# Patient Record
Sex: FEMALE | Race: WHITE | Employment: FULL TIME | URBAN - METROPOLITAN AREA
[De-identification: names, ages, dates, MRNs, and addresses within clinical notes are randomized per-mention and may not be internally consistent; named-entity substitution may affect disease eponyms.]

---

## 2019-07-23 ENCOUNTER — HOSPITAL ENCOUNTER (OUTPATIENT)
Dept: PHYSICAL THERAPY | Age: 29
Discharge: HOME | End: 2019-07-23
Payer: COMMERCIAL

## 2019-07-23 DIAGNOSIS — M54.2 NECK PAIN: ICD-10-CM

## 2019-07-23 DIAGNOSIS — M54.9 UPPER BACK PAIN: ICD-10-CM

## 2019-07-23 PROCEDURE — 97161 PT EVAL LOW COMPLEX 20 MIN: CPT

## 2019-07-23 PROCEDURE — 97110 THERAPEUTIC EXERCISES: CPT

## 2019-07-23 PROCEDURE — 97014 ELECTRIC STIMULATION THERAPY: CPT
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
## 2019-07-23 NOTE — PROGRESS NOTES
Gerardo Hernandez  : 1990  Payor: Romulo Downey / Plan:  [address],[facility] 200 / Product Type: PPO /  [address] [place]  at [facility] Therapy  [address], [facility], [address]  [phone]   [phone]      OUTPATIENT PHYSICAL THERAPY: Daily Treatment Note 2019  Visit Count:  1    ICD-10: Treatment Diagnosis: cervicalgia M54.2, pain in thoracic spine M54.6, muscle spasm of neck M62.838    Pre-treatment Symptoms/Complaints:  Pt reporting feeling tightness and right side headache  Pain: Initial: Pain Intensity 1: 7  Post Session:  6/10   Medications Last Reviewed:  2019  Updated Objective Findings:  See evaluation note from today   TREATMENT:   THERAPEUTIC EXERCISE: (10 minutes):  Exercises per grid below to improve mobility. Required minimal verbal cues to promote proper body mechanics. Progressed resistance, range and repetitions as indicated. Upper trap stretching 4x hold 20 sec   upper thoracic stretching 3x hold 20 sec  Shoulder rolls x 10  Supine cervical retraction x 10 hold 5 sec    Manual Therapy (  na    ): na    Therapeutic Modalities (  15 min   ):MHP and 2 channel premod e-stim x 15 minutes to cervical region with pt in supine and checked on for comfort. Kinesiotape applied to bilateral upper trap region for pain control    Evaluation (x)    HEP: As above; handouts given to patient for all exercises. Treatment/Session Summary:    · Response to Treatment:  Pt reporting feeling slightly better after treatment. She was painful with upper thoracic stretching due to forward bending motion of her head. .  · Communication/Consultation:  None today  · Equipment provided today:  None today  · Recommendations/Intent for next treatment session: Next visit will focus on continued pain management through exercises, modalities and eventual strengthening.   Treatment Plan of Care Effective Dates:  19 to 19  Total Treatment Billable Duration:  eval plus 25 minutes  PT Patient Time In/Time Out  Time In: 0910  Time Out: 640 Vita Gimenez, PT    Future Appointments   Date Time Provider Osmar Conti   7/25/2019  9:00 AM Delta Hodgkin, PT [facility]   7/29/2019  9:00 AM Delta Hodgkin, PT [facility]   7/31/2019  9:00 AM Delta Hodgkin, PT [facility]   5/4/2020 10:45 AM Vishal Grewal MD [facility]

## 2019-07-29 ENCOUNTER — HOSPITAL ENCOUNTER (OUTPATIENT)
Dept: PHYSICAL THERAPY | Age: 29
Discharge: HOME | End: 2019-07-29
Payer: COMMERCIAL

## 2019-07-29 PROCEDURE — 97035 APP MDLTY 1+ULTRASOUND EA 15: CPT

## 2019-07-29 PROCEDURE — 97014 ELECTRIC STIMULATION THERAPY: CPT

## 2019-07-29 PROCEDURE — 97110 THERAPEUTIC EXERCISES: CPT

## 2019-07-29 NOTE — PROGRESS NOTES
Mckenna Bettencourt  : 1990  Payor: Kole Matamoros / Plan:  [address],[facility] 200 / Product Type: PPO /  [address] [place]  at [place] [address] Therapy  [address], [address]  [phone]   [phone]      OUTPATIENT PHYSICAL THERAPY: Daily Treatment Note 2019  Visit Count:  2    ICD-10: Treatment Diagnosis: cervicalgia M54.2, pain in thoracic spine M54.6, muscle spasm of neck M62.838    Pre-treatment Symptoms/Complaints:  Pt reporting she feels better with movement. She has improved from last week and can now look down slightly more with less pain. Pain: Initial: Pain Intensity 1: 4  Post Session:  3/10   Medications Last Reviewed:  2019  Updated Objective Findings:  None Today   TREATMENT:   THERAPEUTIC EXERCISE: (30 minutes):  Exercises per grid below to improve mobility. Required minimal verbal cues to promote proper body mechanics. Progressed resistance, range and repetitions as indicated. UBE x 6 min  Scaption with cervical retraction 4# x 10  Shoulder flexion with cervical retraction 4# x 10  Horizontal abduction/adduction yellow tubing x 20  scap retract red tubing x 20    Manual Therapy (  na    ): na    Therapeutic Modalities (  25 min   ):MHP and 2 channel premod e-stim x 15 minutes to cervical region with pt in supine and checked on for comfort. Kinesiotape applied to bilateral upper trap region for pain control. Pt also received US x 10 minutes to bilateral side of neck and upper trap region to decrease her tightness and improve overall muscle pliability    HEP: continue as directed    Treatment/Session Summary:    · Response to Treatment:  Pt feeling some increased tightness in the neck with her exercises. We limited reps on several to avoid overstressing the area. She received US and e-stim to help further decrease her pain levels and improve muscle pliability. She is gaining flexion. .  · Communication/Consultation:  None today  · Equipment provided today:  None today  · Recommendations/Intent for next treatment session: Next visit will focus on continued pain management through exercises, modalities and eventual strengthening.   Treatment Plan of Care Effective Dates:  7/23/19 to 9/21/19  Total Treatment Billable Duration:  55 min  PT Patient Time In/Time Out  Time In: 0900  Time Out: [facility], PT    Future Appointments   Date Time Provider Osmar Conti   8/1/2019  8:00 AM [facility]   5/4/2020 10:45 AM Ayush Glover MD [facility] [address] [place] [address]

## 2019-07-31 ENCOUNTER — APPOINTMENT (OUTPATIENT)
Dept: PHYSICAL THERAPY | Age: 29
End: 2019-07-31
Payer: COMMERCIAL

## 2019-08-01 ENCOUNTER — HOSPITAL ENCOUNTER (OUTPATIENT)
Dept: PHYSICAL THERAPY | Age: 29
Discharge: HOME | End: 2019-08-01
Payer: COMMERCIAL

## 2019-08-01 PROCEDURE — 97014 ELECTRIC STIMULATION THERAPY: CPT

## 2019-08-01 PROCEDURE — 97110 THERAPEUTIC EXERCISES: CPT

## 2019-08-01 PROCEDURE — 97035 APP MDLTY 1+ULTRASOUND EA 15: CPT

## 2019-08-01 NOTE — PROGRESS NOTES
Lala Cuellar  : 1990  Payor: Shwetha Aceves / Plan:  [address],[facility] 200 / Product Type: PPO /  [address] [place]  at [place] [address] Therapy  [address], [address]  [phone]   [phone]      OUTPATIENT PHYSICAL THERAPY: Daily Treatment Note 2019  Visit Count:  3    ICD-10: Treatment Diagnosis: cervicalgia M54.2, pain in thoracic spine M54.6, muscle spasm of neck M62.838    Pre-treatment Symptoms/Complaints:  Patient reports having some tingling in her scapula area this morning while driving. Pain: Initial: Pain Intensity 1: 5  Post Session:  3/10   Medications Last Reviewed:  2019  Updated Objective Findings:  None Today   TREATMENT:   THERAPEUTIC EXERCISE: (30 minutes):  Exercises per grid below to improve mobility. Required minimal verbal cues to promote proper body mechanics. Progressed resistance, range and repetitions as indicated. UBE x 6 min  Scaption with cervical retraction 4# x 10  Shoulder flexion with cervical retraction 4# x 10  Horizontal abduction/adduction yellow tubing x 20  scap retract red tubing x 20    Manual Therapy (  na    ): na    Therapeutic Modalities (  30 min   ):MHP and 2 channel premod e-stim x 20 minutes to cervical region with pt in supine and checked on for comfort. Kinesiotape applied to bilateral upper trap region for pain control. Pt also received US x 10 minutes to bilateral side of neck and upper trap region to decrease her tightness and improve overall muscle pliability    HEP: continue as directed    Treatment/Session Summary:    · Response to Treatment:  Patient tolerated treatment with mild discomfort today. Patient idnciated that overall her neck was better still some tightness, but nothing like it was. Patient seems very hopeful about her progress and continues to be motivated with home exercises.   · Communication/Consultation:  None today  · Equipment provided today:  None today  · Recommendations/Intent for next treatment session: Next visit will focus on continued pain management through exercises, modalities and eventual strengthening.   Treatment Plan of Care Effective Dates:  7/23/19 to 9/21/19  Total Treatment Billable Duration:  60 min  PT Patient Time In/Time Out  Time In: 0800  Time Out: 0900  Scottie Mccurdy PTA    Future Appointments   Date Time Provider Osmar Conti   5/4/2020 10:45 AM Julissa Fleming MD [facility] [address] [place] [address]

## 2019-10-24 NOTE — THERAPY DISCHARGE
Mckenna Bettencourt : 1990 Payor: Kole Matamoros / Plan:  .[facility] [address] [facility] [address] / Product Type: PPO /  [address] [place]  at [address] 
[address], [address] [place], [address] [phone]   [phone] OUTPATIENT PHYSICAL THERAPY:Discontinuation Summary 2019 ICD-10: Treatment Diagnosis: cervicalgia M54.2, pain in thoracic spine M54.6, muscle spasm of neck M62.838 Precautions/Allergies:  
Latex, natural rubber TREATMENT PLAN: 
Effective Dates: 2019 TO 2019 (60 days). Frequency/Duration: 2 times a week for 60 Day(s) and upon reassessment, will adjust frequency and duration as progress indicates. MEDICAL/REFERRING DIAGNOSIS: 
Cervicalgia [M54.2] Dorsalgia, unspecified [M54.9] DATE OF ONSET: 19 REFERRING PHYSICIAN: Marii Galarza NP MD Orders: [facility] and treat Return MD Appointment: as needed Mckenna Bettencourt was seen in physical therapy from 19 to 19 for 3 visits. Treatment has been discontinued at this time due to patient failing to return for additional treatment. The below goals were met prior to discontinuation. Some goals were not met due to pt not returning for additional therapy. At her last visit, pt was reporting she did feel an overall improvement in her cervical pain. Some tingling was still present in the scapular region. Thank you for this referral.    
  
PROBLEM LIST (Impacting functional limitations): 1. Decreased ADL/Functional Activities 2. Increased Pain 3. Decreased Flexibility/Joint Mobility INTERVENTIONS PLANNED: (Treatment may consist of any combination of the following) 1. Electrical Stimulation 2. Heat 3. Home Exercise Program (HEP) 4. Manual Therapy 5. Range of Motion (ROM) 6. Ultrasound (US)  
 
GOALS: (Goals have been discussed and agreed upon with patient.) Short-Term Functional Goals: Time Frame: 4 weeks 1. Establish independent HEP with no cuing. -met 2. Pt will be able to gain full cervical ROM. -in progress 3. Pt will be able to report no difficulty with sleeping.-in progress Discharge Goals: Time Frame: 8 weeks (60 days) 1. Pt will be able to improve score on NDI by at least 5 points for advanced ADL's.-In progress 2. Pt will be able to report performing housework and ADL's with little to no difficulty.-in progress 3. Pt will be able to drive up to 30 minutes without increased pain.-in progress Regarding Maryse Scruggs's therapy, I certify that the treatment plan above will be carried out by a therapist or under their direction.  
Thank you for this referral, 
Kenyon Camarena, PT

## 2022-12-02 DIAGNOSIS — Z01.419 ENCOUNTER FOR GYNECOLOGICAL EXAMINATION (GENERAL) (ROUTINE) WITHOUT ABNORMAL FINDINGS: ICD-10-CM

## 2022-12-05 RX ORDER — ETONOGESTREL AND ETHINYL ESTRADIOL 11.7; 2.7 MG/1; MG/1
INSERT, EXTENDED RELEASE VAGINAL
Refills: 4 | OUTPATIENT
Start: 2022-12-05